# Patient Record
Sex: MALE | Race: WHITE
[De-identification: names, ages, dates, MRNs, and addresses within clinical notes are randomized per-mention and may not be internally consistent; named-entity substitution may affect disease eponyms.]

---

## 2019-06-23 ENCOUNTER — HOSPITAL ENCOUNTER (EMERGENCY)
Dept: HOSPITAL 47 - EC | Age: 54
End: 2019-06-23
Payer: COMMERCIAL

## 2019-06-23 VITALS — TEMPERATURE: 97 F

## 2019-06-23 DIAGNOSIS — I46.9: Primary | ICD-10-CM

## 2019-06-23 LAB — GLUCOSE BLD-MCNC: 69 MG/DL (ref 75–99)

## 2019-06-23 PROCEDURE — 86901 BLOOD TYPING SEROLOGIC RH(D): CPT

## 2019-06-23 PROCEDURE — 86900 BLOOD TYPING SEROLOGIC ABO: CPT

## 2019-06-23 PROCEDURE — 86920 COMPATIBILITY TEST SPIN: CPT

## 2019-06-23 PROCEDURE — 31500 INSERT EMERGENCY AIRWAY: CPT

## 2019-06-23 PROCEDURE — 86850 RBC ANTIBODY SCREEN: CPT

## 2019-06-23 PROCEDURE — 99285 EMERGENCY DEPT VISIT HI MDM: CPT

## 2019-06-23 PROCEDURE — 92950 HEART/LUNG RESUSCITATION CPR: CPT

## 2019-06-23 PROCEDURE — 36415 COLL VENOUS BLD VENIPUNCTURE: CPT

## 2019-06-23 NOTE — ED
CPR HPI





- General


Stated Complaint: unresponsive


Time Seen by Provider: 19 15:41





- History of Present Illness


Initial Comments: 


Patient is a 54-year-old male with no known previous medical history who 

presents via EMS, priority one is a CPR in progress.  According to EMS, the 

patient was awake and responsive on arrival.  While in transport, they lost 

pulses and began CPR.  On arrival to the emergency department, the patient did 

not have pulses, he was apneic, and unresponsive.  EMS states that he had not 

been feeling well today, he was outside doing yardwork initially and went inside

afterwards stating that he had abdominal pain and lower back pain.








- Related Data


                                    Allergies











Allergy/AdvReac Type Severity Reaction Status Date / Time


 


Unable to Assess Allergy   Verified 19 16:52














Review of Systems


ROS Statement: 


Those systems with pertinent positive or pertinent negative responses have been 

documented in the HPI.





ROS Other: All systems not noted in ROS Statement are negative.


Limitations: ROS unobtainable due to patients medical condition





General Exam


General appearance: other (unresponsive, apneaic, appears pale )


Head exam: Present: atraumatic, normocephalic


Eye exam: Absent: PERRL


ENT exam: Present: normal exam, other (vomitus in the oropharynx )


Neck exam: Present: normal inspection


Respiratory exam: Present: other (patient is apneic.  bilateral breath sounds 

after intubation, decreased and roncherous bilaterally. )


Cardiovascular Exam: Present: other (pulseless, appears pale. )


GI/Abdominal exam: Present: distended


Rectal exam: Present: deferred


 exam: Present: normal inspection


Extremities exam: Present: normal inspection


Neurological exam: Present: other (unresponsive, pupils fixed and dilated. ).  

Absent: alert, oriented X3


Skin exam: Present: cyanosis, pallor, mottled





Course


                                   Vital Signs











  19





  15:40


 


Temperature 97 F L


 


Pulse Rate 0 L


 


Respiratory 0 L





Rate 


 


Blood Pressure 00/00


 


O2 Sat by Pulse 0 L





Oximetry 














Procedures





- Intubation


Laryngoscope: Lee


Size: 4


ET Tube Size: 8


ET Tube Uncuffed: No


Tube Secured Location: lips


Tube Placement Confirmation: visualized tube passing through cords, equal breath

sounds bilaterally, no breath sounds over epigastrium, confirmation by 

capnometry


Patient Tolerated Procedure: well


Intubation Complications: difficult intubation


Additional Comments: 





vomitus in airway.  successful 1 attempt with Mac 4 an 8.0 ET tube.  secured 24 

cm at the lip.  + color change, EtCO2 and pulse ox.  





Medical Decision Making





- Medical Decision Making


Patient is a 54-year-old male with no previous medical history who presents via 

EMS in cardiac arrest.  CPR and resuscitation continued per ACLS protocol.  In 

the ED, patient was given 10 rounds of epinephrine with 2 amps of bicarb.  

Please refer to code recorder note for further detail regarding interventions.  

at one point, ROSC was achieved but ultimately patient lost pulses again.  EKG 

showed a wide QRS complex rhythm with a RBBB.  ST segment elevation noted 

suspect for posterior MI.  Case discussed with interventional cardiology who 

only recommended cath lab if patient was able to be stabilized.  ultimately, 

resuscitative efforts were discontinued at 1628.  patient remained in PEA on the

monitor with no organized cardiac activity on bedside ultrasound.  I spoke with 

the family and updated them on the patients condition.   





I spoke with [facility] office, who requests that the patient be transferred to 

the [facility] [place] with all of the available records.  she will evaluated the patient 

and discuss further with the family.  








- Lab Data


                                   Lab Results











  19 Range/Units





  16:01 


 


POC Glucose (mg/dL)  69 L  (75-99)  mg/dL


 


POC Glu Operater ID  Andres Franco  














Disposition


Clinical Impression: 


 Cardiac arrest





Disposition: 


Is patient prescribed a controlled substance at d/c from ED?: No


Referrals: 


None,Stated [Primary Care Provider] - 1-2 days


Preliminary Cause of Death: cardiopulmonary arrest